# Patient Record
Sex: FEMALE | URBAN - METROPOLITAN AREA
[De-identification: names, ages, dates, MRNs, and addresses within clinical notes are randomized per-mention and may not be internally consistent; named-entity substitution may affect disease eponyms.]

---

## 2020-11-20 ENCOUNTER — DOCUMENTATION ONLY (OUTPATIENT)
Dept: PULMONOLOGY | Age: 50
End: 2020-11-20

## 2020-11-20 NOTE — PROGRESS NOTES
Lf  for pt to speak with Shona Becker to in regs to np sleep apt ref by NP Kriss Villa([facility]) when/where if any evals/studies/cpap/dme?   Ask screening questions

## 2023-02-10 ENCOUNTER — HOSPITAL ENCOUNTER (OUTPATIENT)
Dept: CT IMAGING | Age: 53
Discharge: HOME | End: 2023-02-10
Payer: SELF-PAY

## 2023-02-10 DIAGNOSIS — Z13.6 SCREENING FOR ISCHEMIC HEART DISEASE: ICD-10-CM

## 2023-02-10 PROCEDURE — 75571 CT HRT W/O DYE W/CA TEST: CPT
